# Patient Record
(demographics unavailable — no encounter records)

---

## 2025-01-28 NOTE — PHYSICAL EXAM
[de-identified] : Constitutional o Appearance : well-nourished, well developed, alert, in no acute distress  Head and Face o Head :  Inspection : atraumatic, normocephalic o Face :  Inspection : no visible rash or discoloration Respiratory o Respiratory Effort: breathing unlabored  Neurologic o Mental Status Examination :  Orientation : alert and oriented X 3 Psychiatric o Mood and Affect: mood normal, affect appropriate Cardiovascular o Observation/Palpation : - no swelling Lymphatic o Additional Nodes : No palpable lymph nodes present  Lumbosacral Spine o Inspection : no visible rash or discoloration o Palpation : paraspinal musculature nontender o Range of Motion : extension causes discomfort, side bending is limited,  o Muscle Strength : paraspinal muscle strength and tone within normal limits o Muscle Tone : paraspinal muscle strength and tone within normal limits Tests: straight leg test negative   Right Lower Extremity o Buttock : no tenderness, swelling or deformities o Right Hip :  Inspection/Palpation : no tenderness, swelling or deformities  Range of Motion : full and limited rotation, no crepitance  Stability : joint stability intact  Strength : extension, flexion, adduction, abduction, internal rotation and external rotation 5/5   o Right Knee :   Inspection/Palpation : no medial compartment tenderness or lateral tenderness to palpation, no swelling   Range of Motion : 0-130 active flexion and extension full and painless, no crepitance   Stability : no valgus or varus instability present on provocative testing   Strength : flexion and extension 5/5   Tests and Signs : all tests for stability normal  o Reflexes : patellar tendon reflex 2+, ankle reflex 2+, Babinski sign absent (toes downgoing)    Left Lower Extremity o Buttock : no tenderness, swelling or deformities  o Left Hip :  Inspection/Palpation : no tenderness, swelling or deformities  Range of Motion : full and painless in all planes, no crepitance  Stability : joint stability intact  Strength : extension, flexion, adduction, abduction, internal rotation and external rotation 5/5  o Left Knee :   Inspection/Palpation : no medial compartment tenderness or lateral tenderness to palpation, no swelling   Range of Motion : active flexion and extension full and painless, no crepitance   Stability : no valgus or varus instability present on provocative testing   Strength : flexion and extension 5/5   Tests and Signs : all tests for stability normal  o Reflexes : patellar tendon reflex 2+, ankle reflex 2+, Babinski sign absent (toes downgoing)      Gait: list to the left, no foot drop, limited core strength, no significant extremity swelling or lymphedema  Radiology Results 1/28/2025 Lumbosacral Spine : AP and lateral views were obtained and reveal diffuse degenerative disc disease L5-S1 with severe foraminal stenosis L4-5 and L5-S1 Pelvis: AP pelvis was obtained and revealed moderate to severe degenerative arthritis of both hips left worse than right  Right Knee: Standing AP, lateral, tunnel and skyline views were obtained and revealed mild medial and patellofemoral arthritis  Left Knee: Standing AP, lateral, tunnel and skyline views were obtained and revealed mild medial and moderate patellofemoral arthritis

## 2025-01-28 NOTE — HISTORY OF PRESENT ILLNESS
[de-identified] : 80 year old male presents with his wife who is a reliable historian. The patient has cognitive decline. He is here for evaluation of his balance, as well as lower back and knee pain. His wife states that he has difficulty standing from a seated position and when walking long distances. Patient states he has trouble walking long distances and is constantly looking for a place to sit. He needs to be reminded to stand up straight when walking as he prefers to bend forward. He has had 2 falls recently but no significant injury. He does not walk with an assistive device. He is on a blood thinner.

## 2025-01-28 NOTE — ADDENDUM
[FreeTextEntry1] : I, JORDYN CORRALES, acted solely as a scribe for Dr. Getachew Vee on this date 01/28/2025.  All medical record entries made by the Scribe were at my, Dr. Getachew Vee, direction and personally dictated by me on 01/28/2025. I have reviewed the chart and agree that the record accurately reflects my personal performance of the history, physical exam, assessment and plan. I have also personally directed, reviewed, and agreed with the chart.

## 2025-01-28 NOTE — DISCUSSION/SUMMARY
[address] [facility] #[phone] left a message for a call back from medical records. [de-identified] : I went over the pathophysiology of the patient's symptoms in great detail with the patient. I discussed the underlying pathophysiology of the patient's condition in great detail with the patient. I went over the patient's x-rays with them in great detail. At this time, he will start a course of physical therapy for strengthening, flexibility and ambulation training. A prescription was provided.   All of their questions were answered. They understand and consent to the plan.   FU in 4-6 weeks.
